# Patient Record
Sex: FEMALE | Race: WHITE | URBAN - NONMETROPOLITAN AREA
[De-identification: names, ages, dates, MRNs, and addresses within clinical notes are randomized per-mention and may not be internally consistent; named-entity substitution may affect disease eponyms.]

---

## 2024-02-07 ENCOUNTER — PROCEDURE VISIT (OUTPATIENT)
Dept: ENT CLINIC | Age: 64
End: 2024-02-07
Payer: COMMERCIAL

## 2024-02-07 DIAGNOSIS — H69.92 DYSFUNCTION OF LEFT EUSTACHIAN TUBE: ICD-10-CM

## 2024-02-07 DIAGNOSIS — H93.13 TINNITUS OF BOTH EARS: Primary | ICD-10-CM

## 2024-02-07 DIAGNOSIS — H90.3 SENSORINEURAL HEARING LOSS (SNHL) OF BOTH EARS: ICD-10-CM

## 2024-02-07 PROCEDURE — 92567 TYMPANOMETRY: CPT | Performed by: AUDIOLOGIST

## 2024-02-07 PROCEDURE — 92553 AUDIOMETRY AIR & BONE: CPT | Performed by: AUDIOLOGIST

## 2024-02-07 NOTE — PROGRESS NOTES
History   Marion Arriola is a 63 y.o. female who presented to the clinic this date with complaints of bilateral tinnitus. She reported it has been long standing but has gotten worse over the last 5-6 months. She denied changes in hearing. She reported occasional bilateral aural fullness.     Summary   Tympanometry consistent with normal TM mobility right and negative middle ear pressure left. Pure tone testing indicates mild high frequency SNHL bilaterally with a low frequency conductive drop in the left ear, likely due to middle ear dysfunction.    Patient was counseled on causes of and management strategies for tinnitus.    Results   Otoscopy:   Right: Clear EAC/Normal TM  Left: Clear EAC/Normal TM    Audiometry:   Right: Mild high frequency SNHL  Left:  Mild low frequency conductive hearing loss rising to normal sloping to mild SNHL           Tympanometry:    Right: Type A  Left: Type C    Plan   Results of today's testing were discussed with Ms. Arriola and the following recommendations were made:    Utilize tinnitus management strategies as discussed.   Monitor hearing yearly, sooner with changes.  Hearing protection as warranted.        Audiogram and Acoustic Immittance